# Patient Record
Sex: FEMALE | Race: BLACK OR AFRICAN AMERICAN | NOT HISPANIC OR LATINO | Employment: UNEMPLOYED | URBAN - METROPOLITAN AREA
[De-identification: names, ages, dates, MRNs, and addresses within clinical notes are randomized per-mention and may not be internally consistent; named-entity substitution may affect disease eponyms.]

---

## 2023-03-01 LAB
ERYTHROCYTE DISTRIBUTION WIDTH (RATIO) BY AUTOMATED COUNT: 14.7 % (ref 11.5–14.5)
ERYTHROCYTE MEAN CORPUSCULAR HEMOGLOBIN CONCENTRATION (G/DL) BY AUTOMATED: 30.3 G/DL (ref 32–36)
ERYTHROCYTE MEAN CORPUSCULAR VOLUME (FL) BY AUTOMATED COUNT: 80 FL (ref 80–100)
ERYTHROCYTES (10*6/UL) IN BLOOD BY AUTOMATED COUNT: 3.54 X10E12/L (ref 4–5.2)
FERRITIN, PREGNANCY: 13 UG/L
FOLATE, SERUM, PREGNANCY: 20.1 NG/ML
GLUCOSE, 1 HR SCREEN, PREG: 113 MG/DL
HEMATOCRIT (%) IN BLOOD BY AUTOMATED COUNT: 28.4 % (ref 36–46)
HEMOGLOBIN (G/DL) IN BLOOD: 8.6 G/DL (ref 12–16)
IRON (UG/DL) IN SER/PLAS IN PREGNANCY: 35 UG/DL
IRON BINDING CAPACITY (UG/DL) IN PREGNANCY: >485 UG/DL
IRON SATURATION (%) IN PREGNANCY: ABNORMAL %
LEUKOCYTES (10*3/UL) IN BLOOD BY AUTOMATED COUNT: 8.2 X10E9/L (ref 4.4–11.3)
NRBC (PER 100 WBCS) BY AUTOMATED COUNT: 0 /100 WBC (ref 0–0)
PLATELETS (10*3/UL) IN BLOOD AUTOMATED COUNT: 279 X10E9/L (ref 150–450)
REFLEX ADDED, ANEMIA PANEL: ABNORMAL
SYPHILIS TOTAL AB: NONREACTIVE
VITAMIN B12, PREGNANCY: 305 PG/ML

## 2023-03-02 LAB
CLUE CELLS: ABNORMAL
NUGENT SCORE: 1
YEAST: PRESENT

## 2023-04-02 ENCOUNTER — HOSPITAL ENCOUNTER (INPATIENT)
Dept: DATA CONVERSION | Facility: HOSPITAL | Age: 23
Discharge: HOME | End: 2023-04-02
Attending: OBSTETRICS & GYNECOLOGY | Admitting: OBSTETRICS & GYNECOLOGY
Payer: COMMERCIAL

## 2023-04-02 DIAGNOSIS — D50.9 IRON DEFICIENCY ANEMIA, UNSPECIFIED: ICD-10-CM

## 2023-04-02 DIAGNOSIS — Z3A.33 33 WEEKS GESTATION OF PREGNANCY (HHS-HCC): ICD-10-CM

## 2023-04-02 DIAGNOSIS — O99.019 ANEMIA COMPLICATING PREGNANCY, UNSPECIFIED TRIMESTER (HHS-HCC): ICD-10-CM

## 2023-04-02 DIAGNOSIS — O26.13: ICD-10-CM

## 2023-04-02 DIAGNOSIS — Z34.90 ENCOUNTER FOR SUPERVISION OF NORMAL PREGNANCY, UNSPECIFIED, UNSPECIFIED TRIMESTER (HHS-HCC): ICD-10-CM

## 2023-04-02 DIAGNOSIS — O99.013 ANEMIA COMPLICATING PREGNANCY, THIRD TRIMESTER (HHS-HCC): ICD-10-CM

## 2023-04-02 LAB
ERYTHROCYTE DISTRIBUTION WIDTH (RATIO) BY AUTOMATED COUNT: 14.7 % (ref 11.5–14.5)
ERYTHROCYTE MEAN CORPUSCULAR HEMOGLOBIN CONCENTRATION (G/DL) BY AUTOMATED: 30.6 G/DL (ref 32–36)
ERYTHROCYTE MEAN CORPUSCULAR VOLUME (FL) BY AUTOMATED COUNT: 81 FL (ref 80–100)
ERYTHROCYTES (10*6/UL) IN BLOOD BY AUTOMATED COUNT: 3.73 X10E12/L (ref 4–5.2)
FERRITIN (UG/LL) IN SER/PLAS: 17 UG/L (ref 8–150)
HEMATOCRIT (%) IN BLOOD BY AUTOMATED COUNT: 30.1 % (ref 36–46)
HEMOGLOBIN (G/DL) IN BLOOD: 9.2 G/DL (ref 12–16)
IRON (UG/DL) IN SER/PLAS: 155 UG/DL (ref 35–150)
IRON BINDING CAPACITY (UG/DL) IN SER/PLAS: 552 UG/DL (ref 240–445)
IRON SATURATION (%) IN SER/PLAS: 28 % (ref 25–45)
LEUKOCYTES (10*3/UL) IN BLOOD BY AUTOMATED COUNT: 7 X10E9/L (ref 4.4–11.3)
NRBC (PER 100 WBCS) BY AUTOMATED COUNT: 0.3 /100 WBC (ref 0–0)
PLATELETS (10*3/UL) IN BLOOD AUTOMATED COUNT: 228 X10E9/L (ref 150–450)

## 2023-04-06 ENCOUNTER — HOSPITAL ENCOUNTER (OUTPATIENT)
Dept: DATA CONVERSION | Facility: HOSPITAL | Age: 23
Setting detail: OBSERVATION
Discharge: HOME | End: 2023-04-06
Attending: OBSTETRICS & GYNECOLOGY | Admitting: OBSTETRICS & GYNECOLOGY
Payer: COMMERCIAL

## 2023-04-06 DIAGNOSIS — D50.9 IRON DEFICIENCY ANEMIA, UNSPECIFIED: ICD-10-CM

## 2023-04-06 DIAGNOSIS — O99.019 ANEMIA COMPLICATING PREGNANCY, UNSPECIFIED TRIMESTER (HHS-HCC): ICD-10-CM

## 2023-04-06 DIAGNOSIS — Z3A.39 39 WEEKS GESTATION OF PREGNANCY (HHS-HCC): ICD-10-CM

## 2023-04-06 DIAGNOSIS — O26.13: ICD-10-CM

## 2023-04-06 DIAGNOSIS — O99.013 ANEMIA COMPLICATING PREGNANCY, THIRD TRIMESTER (HHS-HCC): ICD-10-CM

## 2023-04-06 DIAGNOSIS — Z3A.35 35 WEEKS GESTATION OF PREGNANCY (HHS-HCC): ICD-10-CM

## 2023-04-11 ENCOUNTER — HOSPITAL ENCOUNTER (OUTPATIENT)
Dept: DATA CONVERSION | Facility: HOSPITAL | Age: 23
Setting detail: OBSERVATION
Discharge: HOME | End: 2023-04-11
Attending: OBSTETRICS & GYNECOLOGY | Admitting: OBSTETRICS & GYNECOLOGY
Payer: COMMERCIAL

## 2023-04-11 DIAGNOSIS — Z3A.35 35 WEEKS GESTATION OF PREGNANCY (HHS-HCC): ICD-10-CM

## 2023-04-11 DIAGNOSIS — O26.13: ICD-10-CM

## 2023-04-11 DIAGNOSIS — O99.019 ANEMIA COMPLICATING PREGNANCY, UNSPECIFIED TRIMESTER (HHS-HCC): ICD-10-CM

## 2023-04-11 DIAGNOSIS — O99.013 ANEMIA COMPLICATING PREGNANCY, THIRD TRIMESTER (HHS-HCC): ICD-10-CM

## 2023-04-11 DIAGNOSIS — D50.9 IRON DEFICIENCY ANEMIA, UNSPECIFIED: ICD-10-CM

## 2023-04-17 LAB — GROUP B STREP SCREEN: ABNORMAL

## 2023-09-14 NOTE — H&P
HPI/OB History:   Prenatal Care Provider: CECILIA     HPI Descriptive Info:  ·  HPI    21yo G1 at 33.4wga (by LMP c/w 13 wk US) with iron deficiency anemia presenting for IV iron infusion    Pregnancy notable for:  - Anemia, last Hgb 8.6, last ferritin 13 (3/1)  - Low weight gain during pregnancy, plan for growth US @ 36wga     Ob: G1  Gyn: remote hx of GC and trich; last Pap 2022 WNL   PMH: as above  PSH: denies  Meds: PO iron, PNV  Allergies: NKDA  Family Hx: reviewed, non-contributory  Social: denies t/e/d     Prenatal Labs:   Prenatal Labs:    Prenatal Labs:   Blood Typed Date: 10-Oct-2022   Blood Type: O positive   Blood Type Comments: Selected manually 'O positive'  at 02-Apr-2023 10:49   Antibody Screen Results: negative   Rhogam Comments: Selected manually 'O positive'  at 02-Apr-2023 10:49   Chlamydia Date: 10-Oct-2022   Chlamydia Results: negative   Chlamydia Comments: Selected manually 'negative'  at 02-Apr-2023 10:49   Gonorrhea Date: 10-Oct-2022   Gonorrhea Results: negative   Gonorrhea Comments: Selected manually 'negative'  at 02-Apr-2023 10:49   Strep Results: not ordered   Group B Strep Comments: Selected manually 'not  ordered' at 02-Apr-2023 10:49   GCT (dd-mmm-yy): 01-Mar-2023   GCT result: 113   HBsAG Date: 10-Oct-2022   HBsAG Results: negative   HBsAG Comments: Selected manually 'negative' at  02-Apr-2023 10:49   HIV Date: 10-Oct-2022   HIV Results: negative   HIV Comments: Selected manually 'negative' at 02-Apr-2023  10:49   Rubella Date: 10-Oct-2022   Rubella Results: immune   Rubella Comments: Selected manually 'immune' at  02-Apr-2023 10:49   Syphilis (mmm-dd-yyyy): 01-Mar-2023   Syphilis Results: negative   Syphilis Comments: Selected manually 'negative'  at 02-Apr-2023 10:49     Antepartum/Postpartum:   Antepartum/PP:  Final JOHNNA 10-May-2023   Current EGA: 34.4   Patient is > or = 35.0 wks EGA no, EFW is n/a   Fetal Presentation not applicable   Fetal presentation verified by not  applicable   Vaginal Bleeding No   Contractions/Abdominal Pain No   Discharge/Loss of Fluid No   Fetal Movement Good   Hemorrhage Low Risk Factors (T&S) patient with no medium or high risk factors   Hemorrhage Risk Assessment hemorrhage risk completed on admission   Hemorrhage Risk Score Patient is at Low Risk for an OB hemorrhage. Order Type & Screen.   TOLAC no   Did this patient receive progesterone in any form to prevent premature delivery? no   Does patient desire postplacental IUD? uncertain              Allergies:  ·  No Known Allergies :     Medications Prior to Admission:   Admission Medication Reconciliation has not been completed for this patient.    Review of Systems:   All Other Systems: All other systems reviewed and  are negative     Objective:   Physical Exam by System:    Constitutional: Awake, alert, conversant.   Obstetric: Gravid.   Eyes: Clear sclera.   ENMT: MMM.   Head/Neck: NCAT.   Respiratory/Thorax: Breathing comfortably on room  air.   Cardiovascular: Warm and well-perfused.   Extremities: Moving all extremities spontaneously.   Neurological: Grossly intact bilaterally.   Psychological: Appropriate mood and affect.   Skin: No rashes or lesions     Recent Lab Results:    Results:    CBC: 3/1/2023 17:05              \     Hgb     /                              \     8.6 L    /  WBC  ----------------  Plt               8.2       ----------------    279              /     Hct     \                              /     28.4 L    \            RBC: 3.54 L    MCV: 80         Assessment and Plan:   Assessment:    21yo G1 at 34.4wga (by LMP c/w 13 wk US) with iron deficiency anemia presenting for IV iron infusion    IV iron  - Hgb 8.6, ferritin 13 (3/1), will repeat today prior to iron infusion  - Plan for IV iron sucrose 300mg once   - FHR on admission otherwise no fetal monitoring or other lab work indicated today  - Will DC home after iron infusion complete. Plan for follow up with OB provider as  scheduled     IUP  - Tdap declined today, counselled on risks/benefits    Seen and d/w Dr. Zoey Real MD, PGY-1  [facility] Pager [phone]     Attestation:   Note Completion:  I am a:  Resident/Fellow   Attending Attestation I saw and evaluated the patient.  I personally obtained the key and critical portions of the history and physical exam or was physically present for key and  critical portions performed by the resident/fellow. I reviewed the resident/fellow?s documentation and discussed the patient with the resident/fellow.  I agree with the resident/fellow?s medical decision making as documented in the note.     I personally evaluated the patient on 02-Apr-2023         Electronic Signatures:  Lamar Real (MD (Resident))  (Signed 02-Apr-2023 11:06)   Authored: HPI/OB History, Prenatal Labs, Antepartum/PP,  Allergies, Medications Prior to Admission, Review of Systems, Objective, Assessment and Plan, Note Completion  Greg Greer)  (Signed 04-Apr-2023 08:14)   Authored: Note Completion   Co-Signer: Objective, Assessment and Plan, Note Completion      Last Updated: 04-Apr-2023 08:14 by Greg Greer)

## 2023-09-14 NOTE — H&P
HPI/OB History:   Prenatal Care Provider: CECILIA     HPI Descriptive Info:  ·  HPI    21yo G1 at 35.6wga (4/11) by LMP c/w 13 wk US with iron deficiency anemia presenting for IV iron infusion    Pregnancy notable for:  - Anemia, last Hgb 9.2, last ferritin 17 (4/2)  - Low weight gain during pregnancy, plan for growth US @ 36wga     Ob: G1  Gyn: remote hx of GC and trich; last Pap 2022 WNL   PMH: as above  PSH: denies  Meds: PO iron, PNV  Allergies: NKDA  Family Hx: reviewed, non-contributory  Social: denies t/e/d     Antepartum/Postpartum:   Antepartum/PP:  Final JOHNNA 10-May-2023   Current EGA: 35.6   Patient is > or = 35.0 wks EGA no, EFW is n/a   Fetal Presentation not applicable   Fetal presentation verified by not applicable   Hemorrhage Low Risk Factors (T&S) patient with no medium or high risk factors   Hemorrhage Risk Assessment hemorrhage risk completed on admission   Hemorrhage Risk Score Patient is at Low Risk for an OB hemorrhage. Order Type & Screen.   TOLAC no   Did this patient receive progesterone in any form to prevent premature delivery? no   Does patient desire postplacental IUD? uncertain              Allergies:  ·  No Known Allergies :     Medications Prior to Admission:   Admission Medication Reconciliation has not been completed for this patient.    Objective:     Objective Information:        T   P  R  BP   MAP  SpO2   Value  36.4  78  16  102/68   77  99%  Date/Time 4/11 15:15 4/11 15:15 4/11 12:40 4/11 15:15  4/11 12:40 4/11 15:15  Range  (36.3C - 36.6C )  (78 - 82 )  (16 - 18 )  (101 - 106 )/ (65 - 68 )  (77 - 77 )  (97% - 100% )      Physical Exam by System:    Constitutional: alert, oriented, NAD   Respiratory/Thorax: normal respiratory effort   Cardiovascular: regular rate   Gastrointestinal: soft, gravid   Neurological: no focal deficits   Psychological: appropriate affect and mood   Skin: no rashes or lesions visualized     Recent Lab Results:    Results:    CBC: 4/2/2023 11:01               \     Hgb     /                              \     9.2 L    /  WBC  ----------------  Plt               7.0       ----------------    228              /     Hct     \                              /     30.1 L    \            RBC: 3.73 L    MCV: 81         Assessment and Plan:   Assessment:    23yo G1 at 35.6wga (4/11) by LMP c/w 13 wk US with iron deficiency anemia presenting for IV iron infusion    Iron deficiency anemia  - Anemia, last Hgb 9.2, last ferritin 17 (4/2)  - Plan for IV iron sucrose infusion  - Will monitor vitals throughout  - Plan for discharge home after with routine OB follow up    IUP at 35wga  -     Seen and d/w Dr. Chon Massey MD, PGY-2   [facility] Pager [phone]     Attestation:   Note Completion:  I am a:  Resident/Fellow   Attending Attestation I saw and evaluated the patient.  I personally obtained the key and critical portions of the history and physical exam or was physically present for key and  critical portions performed by the resident/fellow. I reviewed the resident/fellow?s documentation and discussed the patient with the resident/fellow.  I agree with the resident/fellow?s medical decision making as documented in the note.     I personally evaluated the patient on 11-Apr-2023         Electronic Signatures:  Anika Massey (Resident))  (Signed 11-Apr-2023 14:18)   Authored: HPI/OB History, Antepartum/PP, Allergies, Medications  Prior to Admission, Objective, Assessment and Plan, Note Completion  Osman Givens)  (Signed 11-Apr-2023 15:49)   Authored: Objective, Note Completion   Co-Signer: Note Completion      Last Updated: 11-Apr-2023 15:49 by Osman Givens)

## 2023-09-14 NOTE — H&P
HPI/OB History:   Prenatal Care Provider: CECILIA     HPI Descriptive Info:  ·  HPI    23yo G1 at 35.1wga (4/6) by LMP c/w 13 wk US with iron deficiency anemia presenting for IV iron infusion    Pregnancy notable for:  - Anemia, last Hgb 9.2, last ferritin 17 (4/2)  - Low weight gain during pregnancy, plan for growth US @ 36wga     Ob: G1  Gyn: remote hx of GC and trich; last Pap 2022 WNL   PMH: as above  PSH: denies  Meds: PO iron, PNV  Allergies: NKDA  Family Hx: reviewed, non-contributory  Social: denies t/e/d     Prenatal Labs:   Prenatal Labs:    Prenatal Labs:   Blood Type: not ordered   Chlamydia Date: 10-Oct-2022   Chlamydia Results: negative   Gonorrhea Date: 10-Oct-2022   Gonorrhea Results: negative   Strep Results: pending collection   Group B Strep Comments: Selected manually 'pending  collection' at 06-Apr-2023 15:07 and will place the order.   GCT (dd-mmm-yy): 01-Mar-2023   GCT result: 113   HBsAG Date: 10-Oct-2022   HBsAG Results: negative   HIV Date: 10-Oct-2022   HIV Results: negative   Rubella Date: 10-Oct-2022   Rubella Results: immune   Rubella Comments: Result Value POSITIVE   Syphilis (mmm-dd-yyyy): 01-Mar-2023   Syphilis Results: negative     Antepartum/Postpartum:   Antepartum/PP:  Final JOHNNA 10-May-2023   Current EGA: 35   Patient is > or = 35.0 wks EGA no, EFW is n/a   Fetal Presentation not applicable   Fetal presentation verified by not applicable   Hemorrhage Low Risk Factors (T&S) patient with no medium or high risk factors   Hemorrhage Risk Assessment hemorrhage risk completed on admission   Hemorrhage Risk Score Patient is at Low Risk for an OB hemorrhage. Order Type & Screen.   TOLAC no   Did this patient receive progesterone in any form to prevent premature delivery? no   Does patient desire postplacental IUD? uncertain              Allergies:  ·  No Known Allergies :     Medications Prior to Admission:   Admission Medication Reconciliation has not been completed for this  patient.    Review of Systems:   All Other Systems: All other systems reviewed and  are negative     Objective:     Objective Information:        T   P  R  BP   MAP  SpO2   Value  36.7  94  18  104/62   76  96%  Date/Time 4/6 13:30 4/6 13:30 4/6 13:30 4/6 13:30  4/6 13:30 4/6 13:30  Range  (36.4C - 36.7C )  (78 - 94 )  (18 - 18 )  (101 - 104 )/ (62 - 63 )  (76 - 76 )  (96% - 97% )    Physical Exam by System:    Constitutional: alert, in no acute distress   Obstetric: FHT 140s by bedside doppler on admit   Eyes: sclera clear   ENMT: MMM   Head/Neck: NCAT   Respiratory/Thorax: normal respiratory effort   Cardiovascular: RRR   Gastrointestinal: abdomen gravid   Genitourinary: deferred   Musculoskeletal: ROM grossly normal   Extremities: no edema or erythema noted   Neurological: no focal deficits   Breast: deferred   Lymphatic: deferred   Psychological: appropriate affect and behavior   Skin: warm, dry, well perfused     Recent Lab Results:    Results:    CBC: 4/2/2023 11:01              \     Hgb     /                              \     9.2 L    /  WBC  ----------------  Plt               7.0       ----------------    228              /     Hct     \                              /     30.1 L    \            RBC: 3.73 L    MCV: 81         Assessment and Plan:   Assessment:    23yo G1 at 35.1wga (4/6) by LMP c/w 13 wk US with iron deficiency anemia presenting for IV iron infusion    Iron deficiency anemia  - Anemia, last Hgb 9.2, last ferritin 17 (4/2)  - Plan for IV iron sucrose infusion  - Will monitor vitals throughout  - Plan for discharge home after with routine OB follow up    IUP at   - T WNL on admit  - Offered to collect GBS, patient declined --> for GBS swab at next OB visit 4/14    Pt seen and discussed w/ Dr. Chyna Kerr MD PGY-4  OBGYN Chief [id number]  Maikol/Cherie      Attestation:   Note Completion:  I am a:  Resident/Fellow   Attending Attestation I saw and evaluated the patient.  I  personally obtained the key and critical portions of the history and physical exam or was physically present for key and  critical portions performed by the resident/fellow. I reviewed the resident/fellow?s documentation and discussed the patient with the resident/fellow.  I agree with the resident/fellow?s medical decision making as documented in the note.     I personally evaluated the patient on 06-Apr-2023         Electronic Signatures:  Reza Clemente)  (Signed 06-Apr-2023 16:42)   Authored: Note Completion   Co-Signer: Prenatal Labs, Review of Systems, Objective, Assessment and Plan, Note Completion  Cookie Kerr (MD (Resident))  (Signed 06-Apr-2023 15:07)   Authored: Prenatal Labs, Review of Systems, Objective,  Assessment and Plan, Note Completion  Anika Massey (MD (Resident))  (Signed 05-Apr-2023 15:30)   Authored: HPI/OB History, Antepartum/PP, Allergies, Medications  Prior to Admission, Objective, Assessment and Plan      Last Updated: 06-Apr-2023 16:42 by Reza Clemente)